# Patient Record
Sex: FEMALE
[De-identification: names, ages, dates, MRNs, and addresses within clinical notes are randomized per-mention and may not be internally consistent; named-entity substitution may affect disease eponyms.]

---

## 2022-06-22 PROBLEM — Z00.00 ENCOUNTER FOR PREVENTIVE HEALTH EXAMINATION: Status: ACTIVE | Noted: 2022-06-22

## 2022-06-24 ENCOUNTER — APPOINTMENT (OUTPATIENT)
Dept: MATERNAL FETAL MEDICINE | Facility: CLINIC | Age: 37
End: 2022-06-24

## 2022-06-24 ENCOUNTER — ASOB RESULT (OUTPATIENT)
Age: 37
End: 2022-06-24

## 2022-06-24 PROCEDURE — 99202 OFFICE O/P NEW SF 15 MIN: CPT | Mod: 95

## 2024-05-29 ENCOUNTER — APPOINTMENT (OUTPATIENT)
Dept: OBGYN | Facility: CLINIC | Age: 39
End: 2024-05-29
Payer: MEDICAID

## 2024-05-29 VITALS
HEIGHT: 61 IN | WEIGHT: 133 LBS | BODY MASS INDEX: 25.11 KG/M2 | DIASTOLIC BLOOD PRESSURE: 67 MMHG | SYSTOLIC BLOOD PRESSURE: 100 MMHG

## 2024-05-29 DIAGNOSIS — Z86.19 PERSONAL HISTORY OF OTHER INFECTIOUS AND PARASITIC DISEASES: ICD-10-CM

## 2024-05-29 DIAGNOSIS — Z11.3 ENCOUNTER FOR SCREENING FOR INFECTIONS WITH A PREDOMINANTLY SEXUAL MODE OF TRANSMISSION: ICD-10-CM

## 2024-05-29 DIAGNOSIS — Z01.419 ENCOUNTER FOR GYNECOLOGICAL EXAMINATION (GENERAL) (ROUTINE) W/OUT ABNORMAL FINDINGS: ICD-10-CM

## 2024-05-29 PROCEDURE — 99385 PREV VISIT NEW AGE 18-39: CPT

## 2024-05-29 PROCEDURE — 81025 URINE PREGNANCY TEST: CPT

## 2024-05-29 RX ORDER — FLUCONAZOLE 150 MG/1
150 TABLET ORAL
Qty: 2 | Refills: 2 | Status: ACTIVE | COMMUNITY
Start: 2024-05-29 | End: 1900-01-01

## 2024-05-29 NOTE — DISCUSSION/SUMMARY
[FreeTextEntry1] : Unremarkable CBE and pelvic exam SBE reviewed Pap and HPV collected Full STI panel per patient Rx Diflucan and given info on Vagibiome as well Healthy diet, exercise, sleep hygiene discussed I reviewed many contraceptive options in detail with patient including NuvaRing, Oral contraceptives, Depo Provera, Nexplanon, Annovera Condoms, Mirena, Kyleena and Paragard IUDs.  Risks and benefits of all of these were discussed. No other gyn concerns today RTO x 1 year or prn

## 2024-05-29 NOTE — HISTORY OF PRESENT ILLNESS
[Patient reported mammogram was normal] : Patient reported mammogram was normal [HIV Test offered] : HIV Test offered [Syphilis test offered] : Syphilis test offered [Gonorrhea test offered] : Gonorrhea test offered [Chlamydia test offered] : Chlamydia test offered [Trichomonas test offered] : Trichomonas test offered [HPV test offered] : HPV test offered [Hepatitis B test offered] : Hepatitis B test offered [Hepatitis C test offered] : Hepatitis C test offered [N] : Patient reports normal menses [Condoms] : uses condoms [Y] : Positive pregnancy history [TextBox_4] : 39 yo  for well woman exam.  No hx abnormal pap smears, abnormal bleeding, fibroids, cysts.  No urinary complaints.  Past medical, surgical, social and family hx reviewed. Does get yeast infections up to 3x yearly, no history of diabetes or family hx.  Would like Diflucan Rx on hand.  Would like to discuss contraceptive options.  Consents to full STI panel. Has completed Gardasil vaccine series. [Mammogramdate] : 2023 [PapSmeardate] : 2021 [LMPDate] : 5/1/24 [PGHxTotal] : 3 [[facility]Term] : 3 [[facility] Tucsoniving] : 3

## 2024-05-30 LAB
C TRACH RRNA SPEC QL NAA+PROBE: NOT DETECTED
HIV1+2 AB SPEC QL IA.RAPID: NONREACTIVE
N GONORRHOEA RRNA SPEC QL NAA+PROBE: NOT DETECTED
SOURCE TP AMPLIFICATION: NORMAL

## 2024-05-31 LAB
HBV SURFACE AG SER QL: NONREACTIVE
HCV AB SER QL: NONREACTIVE
HCV S/CO RATIO: 0.11 S/CO
T PALLIDUM AB SER QL IA: NEGATIVE